# Patient Record
Sex: MALE | Race: WHITE
[De-identification: names, ages, dates, MRNs, and addresses within clinical notes are randomized per-mention and may not be internally consistent; named-entity substitution may affect disease eponyms.]

---

## 2019-04-08 NOTE — EDM.PDOC
ED HPI GENERAL MEDICAL PROBLEM





- General


Chief Complaint: Chest Pain


Stated Complaint: PT HAS CHEST PAINS


Time Seen by Provider: 04/08/19 23:03





- History of Present Illness


INITIAL COMMENTS - FREE TEXT/NARRATIVE: 





HISTORY AND PHYSICAL:





History of present illness:


The patient is a 39-year-old male with a history of hypertension for which he 

takes Toprol-XL but has not had it for the last 2-3 weeks and presents with 

chest pain to the left of the sternum and the upper part of his chest wall that 

started yesterday. He denies any recent upper respiratory symptoms cough runny 

nose sore throat or shortness of breath and has had no trauma to the area. The 

pain does not radiate but sits in that location and it is worse with certain 

movements or if he touches it. The patient said that he works night shift and 

that started when he was at work. He is very active at work and normally does 

not have discomfort or issues with activities. He was able to sleep during the 

day today and when he woke up the pain was still there. He took some Tylenol 

and it did not help so is here for evaluation. The patient has no significant 

family history. In the ED he has no associated symptoms of nausea vomiting 

abdominal pain or shortness of breath. He describes the pain as a sharp pain 

which is localized to the area he is pointing at





Review of systems: 


As per history of present illness and below otherwise all systems reviewed and 

negative.





Past medical history: 


As per history of present illness and as reviewed below otherwise 

noncontributory.





Surgical history: 


As per history of present illness and as reviewed below otherwise 

noncontributory.





Social history: 


No reported history of drug or alcohol abuse.





Family history: 


As per history of present illness and as reviewed below otherwise 

noncontributory.





Physical exam:


HEENT: Atraumatic, normocephalic,  negative for conjunctival pallor or scleral 

icterus, mucous membranes moist, throat clear, neck supple, nontender, trachea 

midline.


Lungs: Clear to auscultation, breath sounds equal bilaterally, chest with 

tenderness to palpation at the left costochondral margin without defects 

deformities or crepitus. There is no wheezing stridor or work of breathing


Heart: S1S2, regular rate and rhythm no overt murmurs


Abdomen: Soft, nondistended, nontender. Negative for masses or 

hepatosplenomegaly. NABS


Pelvis: Stable nontender.


Genitourinary: Deferred.


Rectal: Deferred.


Extremities: Atraumatic, negative for cords or calf pain. Neurovascular 

unremarkable. No pedal edema


Neuro: Awake, alert, oriented. Cranial nerves II through XII unremarkable. 

Cerebellum unremarkable. Motor and sensory unremarkable throughout. Exam 

nonfocal.





Diagnostics:


EKG chest x-ray CBC CMP troponin





Therapeutics:


Toradol aspirin





Patient is feeling improved and is advised for close follow-up.





Impression: 


Left chest wall pain, reproducible, costochondritis





Definitive disposition and diagnosis as appropriate pending reevaluation and 

review of above.


  ** Chest


Pain Score (Numeric/FACES): 8





- Related Data


 Allergies











Allergy/AdvReac Type Severity Reaction Status Date / Time


 


No Known Allergies Allergy   Verified 04/08/19 23:03











Home Meds: 


 Home Meds





Metoprolol Succinate [Toprol XL 50mg] 50 mg PO DAILY 04/08/19 [History]











ED ROS GENERAL





- Review of Systems


Review Of Systems: ROS reveals no pertinent complaints other than HPI.





ED EXAM, GENERAL





- Physical Exam


Exam: See Below (See dictation)





Course





- Vital Signs


Last Recorded V/S: 


 Last Vital Signs











Temp  36.6 C   04/08/19 23:01


 


Pulse  86   04/08/19 23:01


 


Resp  20   04/08/19 23:01


 


BP  143/97 H  04/08/19 23:01


 


Pulse Ox  99   04/08/19 23:01














- Orders/Labs/Meds


Orders: 


 Active Orders 24 hr











 Category Date Time Status


 


 EKG Documentation Completion [RC] STAT Care  04/08/19 23:08 Active











Labs: 


 Laboratory Tests











  04/08/19 04/08/19 Range/Units





  23:18 23:18 


 


WBC  8.92   (4.0-11.0)  K/uL


 


RBC  4.89   (4.50-5.90)  M/uL


 


Hgb  15.2   (13.0-17.0)  g/dL


 


Hct  42.9   (38.0-50.0)  %


 


MCV  87.7   (80.0-98.0)  fL


 


MCH  31.1   (27.0-32.0)  pg


 


MCHC  35.4   (31.0-37.0)  g/dL


 


RDW Std Deviation  41.8   (28.0-62.0)  fl


 


RDW Coeff of Jadiel  13   (11.0-15.0)  %


 


Plt Count  284   (150-400)  K/uL


 


MPV  10.10   (7.40-12.00)  fL


 


Neut % (Auto)  52.8   (48.0-80.0)  %


 


Lymph % (Auto)  31.4   (16.0-40.0)  %


 


Mono % (Auto)  12.0   (0.0-15.0)  %


 


Eos % (Auto)  3.5   (0.0-7.0)  %


 


Baso % (Auto)  0.3   (0.0-1.5)  %


 


Neut # (Auto)  4.7   (1.4-5.7)  K/uL


 


Lymph # (Auto)  2.8 H   (0.6-2.4)  K/uL


 


Mono # (Auto)  1.1 H   (0.0-0.8)  K/uL


 


Eos # (Auto)  0.3   (0.0-0.7)  K/uL


 


Baso # (Auto)  0.0   (0.0-0.1)  K/uL


 


Nucleated RBC %  0.0   /100WBC


 


Nucleated RBCs #  0   K/uL


 


Sodium   139  (136-148)  mmol/L


 


Potassium   3.9  (3.5-5.1)  mmol/L


 


Chloride   105  ()  mmol/L


 


Carbon Dioxide   23.6  (21.0-32.0)  mmol/L


 


BUN   12  (7.0-18.0)  mg/dL


 


Creatinine   1.0  (0.8-1.3)  mg/dL


 


Est Cr Clr Drug Dosing   103.54  mL/min


 


Estimated GFR (MDRD)   > 60.0  ml/min


 


Glucose   115 H  ()  mg/dL


 


Calcium   8.8  (8.5-10.1)  mg/dL


 


Total Bilirubin   0.3  (0.2-1.0)  mg/dL


 


AST   21  (15-37)  IU/L


 


ALT   39  (14-63)  IU/L


 


Alkaline Phosphatase   56  ()  U/L


 


Troponin I   < 0.050  (0.000-0.056)  ng/mL


 


Total Protein   7.3  (6.4-8.2)  g/dL


 


Albumin   3.6  (3.4-5.0)  g/dL


 


Globulin   3.7  (2.6-4.0)  g/dL


 


Albumin/Globulin Ratio   1.0  (0.9-1.6)  











Meds: 


Medications














Discontinued Medications














Generic Name Dose Route Start Last Admin





  Trade Name Teresa  PRN Reason Stop Dose Admin


 


Aspirin  324 mg  04/08/19 23:08  04/08/19 23:16





  Aspirin  PO  04/08/19 23:09  324 mg





  ONETIME ONE   Administration





     





     





     





     


 


Ketorolac Tromethamine  60 mg  04/08/19 23:08  04/08/19 23:17





  Toradol  IM  04/08/19 23:09  60 mg





  ONETIME ONE   Administration





     





     





     





     














Departure





- Departure


Time of Disposition: 00:27


Disposition: Home, Self-Care 01


Condition: Good


Clinical Impression: 


 Chest wall pain








- Discharge Information


Forms:  ED Department Discharge


Additional Instructions: 


The following information is given to patients seen in the emergency department 

who are being discharged to home. This information is to outline your options 

for follow-up care. We provide all patients seen in our emergency department 

with a follow-up referral.





The need for follow-up, as well as the timing and circumstances, are variable 

depending upon the specifics of your emergency department visit.





If you don't have a primary care physician on staff, we will provide you with a 

referral. We always advise you to contact your personal physician following an 

emergency department visit to inform them of the circumstance of the visit and 

for follow-up with them and/or the need for any referrals to a consulting 

specialist.





The emergency department will also refer you to a specialist when appropriate. 

This referral assures that you have the opportunity for followup care with a 

specialist. All of these measure are taken in an effort to provide you with 

optimal care, which includes your followup.





Under all circumstances we always encourage you to contact your private 

physician who remains a resource for coordinating  your care. When calling for 

followup care, please make the office aware that this follow-up is from your 

recent emergency room visit. If for any reason you are refused follow-up, 

please contact the [facility] emergency 

department at [phone] and ask to speak to the emergency department 

charge nurse.





[facility] 


Primary care- Internal Medicine and Family [address] [address]


[phone]








Please use over-the-counter ibuprofen/Motrin for pain as we discussed and try 

to ice areas after activities and work. Please resume taking your Toprol-XL as 

a given you a small refill to restart. Please call and schedule a follow-up 

appointment in the clinic and return to ER as needed and as discussed





- My Orders


Last 24 Hours: 


My Active Orders





04/08/19 23:08


EKG Documentation Completion [RC] STAT 














- Assessment/Plan


Last 24 Hours: 


My Active Orders





04/08/19 23:08


EKG Documentation Completion [RC] STAT

## 2019-04-09 NOTE — CR
INDICATION:



Chest pain 



COMPARISON:



None available. 



FINDINGS:



An erect single view of the chest was obtained at 23 23 hours. 



The lungs are clear. No focal or diffuse infiltrates are present.



Incidental note is made of an azygos fissure.



The heart is normal in size. 



The mediastinum is normal in appearance. 



The osseous structures are normal in appearance for the patient`s age. 



IMPRESSION:



Normal chest single view.



Dictated by Yohan Gaspar MD @ Apr 9 2019 12:22AM



Signed by Dr. Yohan Gaspar @ Apr 9 2019 12:23AM

## 2019-04-11 NOTE — EDM.PDOC
ED HPI GENERAL MEDICAL PROBLEM





- General


Chief Complaint: Back Pain or Injury


Stated Complaint: PT HAS BACK AND STOMACH PAIN


Time Seen by Provider: 04/11/19 23:10





- History of Present Illness


INITIAL COMMENTS - FREE TEXT/NARRATIVE: 





HISTORY AND PHYSICAL:





History of present illness:


Patient is a 39-year-old male with no significant GI or  history who presents 

with complaints of bilateral lower back pain that started yesterday and this 

morning waking up and having the back pain still present but then radiating to 

bilateral lower abdominal area. He has not had any difficulty initiating a 

stream urgency frequency dysuria or hematuria and no upper flank pain. He had 

nausea and vomiting throughout the day and was not able to keep much down but 

did have an appetite. He did not take his temperature or have a fever that he 

is aware of and has no chest pain shortness of breath or upper abdominal pain. 

He says the pain radiates to his testicles but he has not noticed any stress 

testicular masses or swelling. The patient has no weakness in his legs nor does 

the back pain or abdominal pain radiate to his legs and has no neurosensory 

changes in his legs. He has no upper back pain and no midline back pain. The 

patient took nothing for this discomfort prior to coming here and nothing for 

the nausea and vomiting. He has had normal bowel movements without diarrhea 

black or bloody stools.





Review of systems: 


As per history of present illness and below otherwise all systems reviewed and 

negative.





Past medical history: 


As per history of present illness and as reviewed below otherwise 

noncontributory.





Surgical history: 


As per history of present illness and as reviewed below otherwise 

noncontributory.





Social history: 


No reported history of drug or alcohol abuse.





Family history: 


As per history of present illness and as reviewed below otherwise 

noncontributory.





Physical exam:


General: Well-developed well-nourished man who is nontoxic and moves very 

easily in the ED without distress. Vital signs are noted by me


HEENT: Atraumatic, normocephalic, pupils reactive, negative for conjunctival 

pallor or scleral icterus, mucous membranes moist, throat clear, neck supple, 

nontender, trachea midline.


Lungs: Clear to auscultation, breath sounds equal bilaterally, chest nontender.


Heart: S1S2, regular, negative for clicks, rubs, or JVD.


Abdomen: Soft, nondistended some mild tenderness in the lower abdomen 

throughout without rebound or guarding and bowel sounds are hypoactive. There 

is tympany on percussion of the upper abdomen without tenderness. Negative for 

masses or hepatosplenomegaly. Negative for costovertebral tenderness.


Pelvis: Stable nontender.


Genitourinary: Normal male with descended testicles bilaterally without any 

evidence of masses tenderness or swelling


Rectal: Deferred.


Extremities: Atraumatic, negative for cords or calf pain. Neurovascular 

unremarkable. Full range of motion of all extremities without defects or 

deficits


Neuro: Awake, alert, oriented. Cranial nerves II through XII unremarkable. 

Cerebellum unremarkable. Motor and sensory unremarkable throughout. Exam 

nonfocal.


Back: There are no midline step-offs in his defects of the thoracic or lumbar 

spine no CVA tenderness and no posterior pelvis tenderness. On palpation of the 

paraspinal musculature I cannot reproduce the pain.


Diagnostics:


CBC CMP amylase lipase UA with reflex CT scan abdomen and pelvis





Therapeutics:


IV fluids Toradol Zofran





Patient is resting comfortably and says his nausea is much improved and his 

pain is also improved. I discussed all testing results with him and significant 

other at bedside and I discussed the CT scan findings and labs with Dr. Rodrigues at 1 AM as well as with Dr. Ochoa our hospitalist at 1:12 AM. As the 

CT scan is abnormal with the free fluid Dr. Rodrigues recommends observation and 

he will be on consult if Dr. Ochoa would like that. Dr. Ochoa states he would 

like Dr. Rodrigues on consult and he will admit him as an observation. I've 

written for maintenance IV fluids and the patient is agreeable they're both 

aware of the lab abnormalities and the patient's clinical state and presentation





Impression: 


Lower back and lower abdominal pain with abnormal CT etiology unclear, 

paracentral disc disease L4-L5 stable, diminished renal function etiology 

unclear





Definitive disposition and diagnosis as appropriate pending reevaluation and 

review of above.


  ** low back;low abdomen


Pain Score (Numeric/FACES): 6





- Related Data


 Allergies











Allergy/AdvReac Type Severity Reaction Status Date / Time


 


No Known Allergies Allergy   Verified 04/11/19 23:08











Home Meds: 


 Home Meds





Metoprolol Succinate [Toprol XL 50mg] 50 mg PO DAILY 04/08/19 [History]











Past Medical History


Cardiovascular History: Reports: Hypertension


Neurological History: Reports: Concussion


Psychiatric History: Reports: Panic Attack





- Infectious Disease History


Infectious Disease History: Reports: Chicken Pox





Social & Family History





- Family History


Family Medical History: Noncontributory





- Tobacco Use


Smoking Status *Q: Current Every Day Smoker


Years of Tobacco use: 20


Packs/Tins Daily: 1





- Recreational Drug Use


Recreational Drug Use: No





ED ROS GENERAL





- Review of Systems


Review Of Systems: ROS reveals no pertinent complaints other than HPI.





ED EXAM, GENERAL





- Physical Exam


Exam: See Below (see Dictation)





Course





- Vital Signs


Last Recorded V/S: 


 Last Vital Signs











Temp  37.1 C   04/11/19 23:00


 


Pulse  67   04/11/19 23:00


 


Resp  18   04/11/19 23:00


 


BP  178/104 H  04/11/19 23:00


 


Pulse Ox  96   04/11/19 23:00














- Orders/Labs/Meds


Orders: 


 Active Orders 24 hr











 Category Date Time Status


 


 Patient Status [ADT] Stat ADT  04/12/19 01:15 Ordered


 


 Notify Provider Consults [RC] ASDIRECTED Care  04/12/19 01:15 Ordered


 


 Consult to Physician [CONS] Stat Cons  04/12/19 01:15 Ordered


 


 Sodium Chloride 0.9% @ 150 MLS/HR (1,000ml) Med  04/12/19 01:15 Ordered





 Sodium Chloride 0.9% [Normal Saline] 1,000 ml   





 IV ASDIRECTED   


 


 Sodium Chloride 0.9% [Saline Flush] Med  04/11/19 23:15 Active





 10 ml FLUSH ASDIRECTED PRN   


 


 Sodium Chloride 0.9% [Saline Flush] Med  04/11/19 23:15 Active





 2.5 ml FLUSH ASDIRECTED PRN   


 


 Saline Lock Insert [OM.PC] Stat Oth  04/11/19 23:14 Ordered








 Medication Orders





Sodium Chloride (Saline Flush)  10 ml FLUSH ASDIRECTED PRN


   PRN Reason: Keep Vein Open


Sodium Chloride (Saline Flush)  2.5 ml FLUSH ASDIRECTED PRN


   PRN Reason: Keep Vein Open








Labs: 


 Laboratory Tests











  04/11/19 04/11/19 04/11/19 Range/Units





  22:23 23:22 23:22 


 


WBC   12.94 H   (4.0-11.0)  K/uL


 


RBC   4.96   (4.50-5.90)  M/uL


 


Hgb   15.3   (13.0-17.0)  g/dL


 


Hct   43.6   (38.0-50.0)  %


 


MCV   87.9   (80.0-98.0)  fL


 


MCH   30.8   (27.0-32.0)  pg


 


MCHC   35.1   (31.0-37.0)  g/dL


 


RDW Std Deviation   41.3   (28.0-62.0)  fl


 


RDW Coeff of Jadiel   13   (11.0-15.0)  %


 


Plt Count   274   (150-400)  K/uL


 


MPV   10.50   (7.40-12.00)  fL


 


Neut % (Auto)   80.3 H   (48.0-80.0)  %


 


Lymph % (Auto)   7.7 L   (16.0-40.0)  %


 


Mono % (Auto)   11.7   (0.0-15.0)  %


 


Eos % (Auto)   0.1   (0.0-7.0)  %


 


Baso % (Auto)   0.2   (0.0-1.5)  %


 


Neut # (Auto)   10.4 H   (1.4-5.7)  K/uL


 


Lymph # (Auto)   1.0   (0.6-2.4)  K/uL


 


Mono # (Auto)   1.5 H   (0.0-0.8)  K/uL


 


Eos # (Auto)   0.0   (0.0-0.7)  K/uL


 


Baso # (Auto)   0.0   (0.0-0.1)  K/uL


 


Nucleated RBC %   0.0   /100WBC


 


Nucleated RBCs #   0   K/uL


 


Sodium    144  (136-148)  mmol/L


 


Potassium    3.7  (3.5-5.1)  mmol/L


 


Chloride    106  ()  mmol/L


 


Carbon Dioxide    24.7  (21.0-32.0)  mmol/L


 


BUN    14  (7.0-18.0)  mg/dL


 


Creatinine    2.4 H  (0.8-1.3)  mg/dL


 


Est Cr Clr Drug Dosing    43.14  mL/min


 


Estimated GFR (MDRD)    30.0  ml/min


 


Glucose    124 H  ()  mg/dL


 


Calcium    8.8  (8.5-10.1)  mg/dL


 


Total Bilirubin    0.5  (0.2-1.0)  mg/dL


 


AST    22  (15-37)  IU/L


 


ALT    27  (14-63)  IU/L


 


Alkaline Phosphatase    53  ()  U/L


 


Total Protein    7.3  (6.4-8.2)  g/dL


 


Albumin    3.4  (3.4-5.0)  g/dL


 


Globulin    3.9  (2.6-4.0)  g/dL


 


Albumin/Globulin Ratio    0.9  (0.9-1.6)  


 


Amylase    30  ()  U/L


 


Lipase    95  ()  U/L


 


Urine Color  YELLOW    


 


Urine Appearance  CLEAR    


 


Urine pH  6.0    (5.0-8.0)  


 


Ur Specific Gravity  1.010    (1.001-1.035)  


 


Urine Protein  100 H    (NEGATIVE)  mg/dL


 


Urine Glucose (UA)  NEGATIVE    (NEGATIVE)  mg/dL


 


Urine Ketones  NEGATIVE    (NEGATIVE)  mg/dL


 


Urine Occult Blood  SMALL H    (NEGATIVE)  


 


Urine Nitrite  NEGATIVE    (NEGATIVE)  


 


Urine Bilirubin  NEGATIVE    (NEGATIVE)  


 


Urine Urobilinogen  0.2    (<2.0)  EU/dL


 


Ur Leukocyte Esterase  NEGATIVE    (NEGATIVE)  


 


Urine RBC  0-2    (0-2/HPF)  


 


Urine WBC  0-3    (0-5/HPF)  


 


Ur Epithelial Cells  RARE    (NONE-FEW)  


 


Urine Bacteria  FEW    (NEGATIVE)  


 


Urine Mucus  LIGHT    (NONE-MOD)  











Meds: 


Medications











Generic Name Dose Route Start Last Admin





  Trade Name Freq  PRN Reason Stop Dose Admin


 


Sodium Chloride  10 ml  04/11/19 23:15  





  Saline Flush  FLUSH   





  ASDIRECTED PRN   





  Keep Vein Open   





     





     





     


 


Sodium Chloride  2.5 ml  04/11/19 23:15  





  Saline Flush  FLUSH   





  ASDIRECTED PRN   





  Keep Vein Open   





     





     





     














Discontinued Medications














Generic Name Dose Route Start Last Admin





  Trade Name Freq  PRN Reason Stop Dose Admin


 


Sodium Chloride  1,000 mls @ 999 mls/hr  04/11/19 23:15  04/11/19 23:24





  Normal Saline  IV  04/12/19 00:15  999 mls/hr





  STAT ONE   Administration





     





     





     





     


 


Ketorolac Tromethamine  30 mg  04/11/19 23:15  04/11/19 23:26





  Toradol  IVPUSH  04/11/19 23:16  30 mg





  ONETIME ONE   Administration





     





     





     





     


 


Ondansetron HCl  4 mg  04/11/19 23:15  04/11/19 23:25





  Zofran  IVPUSH  04/11/19 23:16  4 mg





  ONETIME ONE   Administration





     





     





     





     














Departure





- Departure


Time of Disposition: 01:18


Disposition: Refer to Observation


Condition: Good


Clinical Impression: 


 Abnormal CT scan, Decreased renal function





Abdominal pain


Qualifiers:


 Abdominal location: lower abdomen, unspecified Qualified Code(s): R10.30 - 

Lower abdominal pain, unspecified








- Discharge Information


Referrals: 


PCP,None [Primary Care Provider] - 


Forms:  ED Department Discharge





- My Orders


Last 24 Hours: 


My Active Orders





04/11/19 23:14


Saline Lock Insert [OM.PC] Stat 





04/11/19 23:15


Sodium Chloride 0.9% [Saline Flush]   10 ml FLUSH ASDIRECTED PRN 


Sodium Chloride 0.9% [Saline Flush]   2.5 ml FLUSH ASDIRECTED PRN 





04/12/19 01:15


Patient Status [ADT] Stat 


Notify Provider Consults [RC] ASDIRECTED 


Consult to Physician [CONS] Stat 


Sodium Chloride 0.9% @ 150 MLS/HR (1,000ml) Sodium Chloride 0.9% [Normal Saline

] 1,000 ml IV ASDIRECTED 














- Assessment/Plan


Last 24 Hours: 


My Active Orders





04/11/19 23:14


Saline Lock Insert [OM.PC] Stat 





04/11/19 23:15


Sodium Chloride 0.9% [Saline Flush]   10 ml FLUSH ASDIRECTED PRN 


Sodium Chloride 0.9% [Saline Flush]   2.5 ml FLUSH ASDIRECTED PRN 





04/12/19 01:15


Patient Status [ADT] Stat 


Notify Provider Consults [RC] ASDIRECTED 


Consult to Physician [CONS] Stat 


Sodium Chloride 0.9% @ 150 MLS/HR (1,000ml) Sodium Chloride 0.9% [Normal Saline

] 1,000 ml IV ASDIRECTED

## 2019-04-12 NOTE — [id number]
**Discharge Summary





- Hospital Course


HPI Initial Comments: 





The patient was admitted due to lower abdominal and lower back pain. CT scan 

showed pelvic free fluid.


Diagnosis: Stroke: No





- Discharge Data


Discharge Date: 04/12/19


Discharge Disposition: Home, Self-Care 01


Condition: Good





- Discharge Diagnosis/Problem(s)


(1) HTN (hypertension)


SNOMED Code(s): 49100233


   ICD Code: I10 - ESSENTIAL (PRIMARY) HYPERTENSION   Status: Chronic   Priority

: High   


Qualifiers: 


   Hypertension type: essential hypertension   Qualified Code(s): I10 - 

Essential (primary) hypertension   





(2) Abdominal pain


SNOMED Code(s): 85185834


   ICD Code: R10.9 - UNSPECIFIED ABDOMINAL PAIN   Status: Acute   Priority: 

High   


Qualifiers: 


   Abdominal location: lower abdomen, unspecified   Qualified Code(s): R10.30 - 

Lower abdominal pain, unspecified   





(3) Decreased renal function


SNOMED Code(s): 38724246


   ICD Code: N28.9 - DISORDER OF KIDNEY AND URETER, UNSPECIFIED   Status: Acute

   Priority: High   





(4) Renal insufficiency, mild


SNOMED Code(s): 286396326


   ICD Code: N28.9 - DISORDER OF KIDNEY AND URETER, UNSPECIFIED   Status: Acute

   Priority: High   





(5) Bacterial prostatitis


SNOMED Code(s): 833629044024148


   ICD Code: N41.8 - OTHER INFLAMMATORY DISEASES OF PROSTATE   Status: Acute   





- Patient Summary/Data


Hospital Course: 





The patient is a 39-year-old gentleman who had presented to the emergency 

department both on April 9, 2019 and April 11, 2019 with a complaint of 

bilateral lower abdominal pain. The patient had denied any difficulties with 

urination, hematuria or dysuria. He also had denied any fever or chills. The 

patient says that he has had pain that radiates from his lower abdomen into his 

testicles. The patient also has had persistent nausea and vomiting. The patient 

has had no specific aggravating or relieving factors other than pain 

medications made his abdominal and back pain better. CT scan through the 

emergency department had revealed minimal amount of free pelvic fluid. The 

surgeon had been consulted with regards to this informally consult this this 

was not due to any abdominal abnormality. The patient was also noted to have 

calcifications in his prostate. During the short course of hospitalization the 

patient had continued to improve.The patient had been placed on ciprofloxacin 

500 mg by mouth to twice a day as a concern for chronic prostatitis. He been 

referred to urology. He also been given a prescription for tramadol. Tramadol 

is at 50 mg by mouth every 6 hours as needed for pain. The patient had been 

tolerating his diet as recommended to continue with the diet as tolerated. He 

is also to have activity as tolerated. I've recommended further the patient 

follow-up with a primary care physician. The patient had been hemodynamically 

stable and he is discharged from acute hospitalization with the recommendations 

listed above.





- Patient Instructions


Diet: Heart Healthy Diet


Activity: As Tolerated


Driving: [address] Drive Today





- Discharge Plan


*PRESCRIPTION DRUG MONITORING PROGRAM REVIEWED*: No


*COPY OF PRESCRIPTION DRUG MONITORING REPORT IN PATIENT LEXIS: No


Prescriptions/Med Rec: 


Ciprofloxacin HCl [Cipro] 500 mg PO BID #14 tablet


traMADol [Ultram] 50 mg PO Q6H PRN #21 tab


 PRN Reason: Abdominal Pain


Home Medications: 


 Home Meds





Metoprolol Succinate [Toprol XL 50mg] 50 mg PO DAILY 04/08/19 [History]


Ciprofloxacin HCl [Cipro] 500 mg PO BID #14 tablet 04/12/19 [Rx]


traMADol [Ultram] 50 mg PO Q6H PRN #21 tab 04/12/19 [Rx]








Oxygen Therapy Mode: Room Air


Patient Handouts:  Tramadol tablets, Prostatitis, Easy-to-Read, Ciprofloxacin 

tablets


Referrals: 


Marcell Hampton MD [Resident] -  (Please call Monday, April 15 and schedule a 

follow up appointment for 1 week.)


Toma Florian MD [Physician] -  (Please call on Monday, April 15th and 

schedule a follow up appointment as soon as possible.)





- Discharge Summary/Plan Comment


DC Time >30 min.: Yes





- General Info


Date of Service: 04/13/19


Admission Dx/Problem (Free Text: 


 Admission Diagnosis/Problem





Admission Diagnosis/Problem      Abdominal pain








Functional Status: Reports: Pain Controlled





- Review of Systems


General: Reports: No Symptoms


HEENT: Reports: No Symptoms


Pulmonary: Reports: No Symptoms


Cardiovascular: Reports: No Symptoms


Gastrointestinal: Reports: No Symptoms


Genitourinary: Reports: No Symptoms


Musculoskeletal: Reports: No Symptoms


Skin: Reports: No Symptoms


Neurological: Reports: No Symptoms


Psychiatric: Reports: No Symptoms





- Patient Data


Vitals - Most Recent: 


 Last Vital Signs











Temp  37.4 C   04/12/19 16:00


 


Pulse  65   04/12/19 16:00


 


Resp  18   04/12/19 16:00


 


BP  169/99 H  04/12/19 16:00


 


Pulse Ox  98   04/12/19 16:00











Weight - Most Recent: 87.203 kg


I&O - Last 24 hours: 


 Intake & Output











 04/12/19 04/12/19 04/12/19





 06:59 14:59 22:59


 


Intake Total 450  1540


 


Output Total 350  400


 


Balance 100  1140











Lab Results - Last 24 hrs: 


 Laboratory Results - last 24 hr











  04/11/19 04/11/19 04/11/19 Range/Units





  22:23 23:22 23:22 


 


WBC   12.94 H   (4.0-11.0)  K/uL


 


RBC   4.96   (4.50-5.90)  M/uL


 


Hgb   15.3   (13.0-17.0)  g/dL


 


Hct   43.6   (38.0-50.0)  %


 


MCV   87.9   (80.0-98.0)  fL


 


MCH   30.8   (27.0-32.0)  pg


 


MCHC   35.1   (31.0-37.0)  g/dL


 


RDW Std Deviation   41.3   (28.0-62.0)  fl


 


RDW Coeff of Jadiel   13   (11.0-15.0)  %


 


Plt Count   274   (150-400)  K/uL


 


MPV   10.50   (7.40-12.00)  fL


 


Neut % (Auto)   80.3 H   (48.0-80.0)  %


 


Lymph % (Auto)   7.7 L   (16.0-40.0)  %


 


Mono % (Auto)   11.7   (0.0-15.0)  %


 


Eos % (Auto)   0.1   (0.0-7.0)  %


 


Baso % (Auto)   0.2   (0.0-1.5)  %


 


Neut # (Auto)   10.4 H   (1.4-5.7)  K/uL


 


Lymph # (Auto)   1.0   (0.6-2.4)  K/uL


 


Mono # (Auto)   1.5 H   (0.0-0.8)  K/uL


 


Eos # (Auto)   0.0   (0.0-0.7)  K/uL


 


Baso # (Auto)   0.0   (0.0-0.1)  K/uL


 


Nucleated RBC %   0.0   /100WBC


 


Nucleated RBCs #   0   K/uL


 


Sodium    144  (136-148)  mmol/L


 


Potassium    3.7  (3.5-5.1)  mmol/L


 


Chloride    106  ()  mmol/L


 


Carbon Dioxide    24.7  (21.0-32.0)  mmol/L


 


BUN    14  (7.0-18.0)  mg/dL


 


Creatinine    2.4 H  (0.8-1.3)  mg/dL


 


Est Cr Clr Drug Dosing    43.14  mL/min


 


Estimated GFR (MDRD)    30.0  ml/min


 


Glucose    124 H  ()  mg/dL


 


Calcium    8.8  (8.5-10.1)  mg/dL


 


Total Bilirubin    0.5  (0.2-1.0)  mg/dL


 


AST    22  (15-37)  IU/L


 


ALT    27  (14-63)  IU/L


 


Alkaline Phosphatase    53  ()  U/L


 


Total Protein    7.3  (6.4-8.2)  g/dL


 


Albumin    3.4  (3.4-5.0)  g/dL


 


Globulin    3.9  (2.6-4.0)  g/dL


 


Albumin/Globulin Ratio    0.9  (0.9-1.6)  


 


Amylase    30  ()  U/L


 


Lipase    95  ()  U/L


 


Urine Color  YELLOW    


 


Urine Appearance  CLEAR    


 


Urine pH  6.0    (5.0-8.0)  


 


Ur Specific Gravity  1.010    (1.001-1.035)  


 


Urine Protein  100 H    (NEGATIVE)  mg/dL


 


Urine Glucose (UA)  NEGATIVE    (NEGATIVE)  mg/dL


 


Urine Ketones  NEGATIVE    (NEGATIVE)  mg/dL


 


Urine Occult Blood  SMALL H    (NEGATIVE)  


 


Urine Nitrite  NEGATIVE    (NEGATIVE)  


 


Urine Bilirubin  NEGATIVE    (NEGATIVE)  


 


Urine Urobilinogen  0.2    (<2.0)  EU/dL


 


Ur Leukocyte Esterase  NEGATIVE    (NEGATIVE)  


 


Urine RBC  0-2    (0-2/HPF)  


 


Urine WBC  0-3    (0-5/HPF)  


 


Ur Epithelial Cells  RARE    (NONE-FEW)  


 


Urine Bacteria  FEW    (NEGATIVE)  


 


Urine Mucus  LIGHT    (NONE-MOD)  














  04/12/19 04/12/19 04/12/19 Range/Units





  05:20 05:20 14:39 


 


WBC  9.64    (4.0-11.0)  K/uL


 


RBC  4.52    (4.50-5.90)  M/uL


 


Hgb  13.6    (13.0-17.0)  g/dL


 


Hct  40.3    (38.0-50.0)  %


 


MCV  89.2    (80.0-98.0)  fL


 


MCH  30.1    (27.0-32.0)  pg


 


MCHC  33.7    (31.0-37.0)  g/dL


 


RDW Std Deviation  42.4    (28.0-62.0)  fl


 


RDW Coeff of Jadiel  13    (11.0-15.0)  %


 


Plt Count  238    (150-400)  K/uL


 


MPV  10.40    (7.40-12.00)  fL


 


Neut % (Auto)  64.7    (48.0-80.0)  %


 


Lymph % (Auto)  21.4    (16.0-40.0)  %


 


Mono % (Auto)  13.4    (0.0-15.0)  %


 


Eos % (Auto)  0.4    (0.0-7.0)  %


 


Baso % (Auto)  0.1    (0.0-1.5)  %


 


Neut # (Auto)  6.2 H    (1.4-5.7)  K/uL


 


Lymph # (Auto)  2.1    (0.6-2.4)  K/uL


 


Mono # (Auto)  1.3 H    (0.0-0.8)  K/uL


 


Eos # (Auto)  0.0    (0.0-0.7)  K/uL


 


Baso # (Auto)  0.0    (0.0-0.1)  K/uL


 


Nucleated RBC %  0.0    /100WBC


 


Nucleated RBCs #  0    K/uL


 


Sodium   144  142  (136-148)  mmol/L


 


Potassium   3.4 L  3.5  (3.5-5.1)  mmol/L


 


Chloride   106  105  ()  mmol/L


 


Carbon Dioxide   26.5  27.3  (21.0-32.0)  mmol/L


 


BUN   14  19 H  (7.0-18.0)  mg/dL


 


Creatinine   2.2 H  2.5 H  (0.8-1.3)  mg/dL


 


Est Cr Clr Drug Dosing   47.06  41.42  mL/min


 


Estimated GFR (MDRD)   33.2  28.6  ml/min


 


Glucose   101  117 H  ()  mg/dL


 


Calcium   8.0 L  8.0 L  (8.5-10.1)  mg/dL


 


Total Bilirubin     (0.2-1.0)  mg/dL


 


AST     (15-37)  IU/L


 


ALT     (14-63)  IU/L


 


Alkaline Phosphatase     ()  U/L


 


Total Protein     (6.4-8.2)  g/dL


 


Albumin     (3.4-5.0)  g/dL


 


Globulin     (2.6-4.0)  g/dL


 


Albumin/Globulin Ratio     (0.9-1.6)  


 


Amylase     ()  U/L


 


Lipase     ()  U/L


 


Urine Color     


 


Urine Appearance     


 


Urine pH     (5.0-8.0)  


 


Ur Specific Gravity     (1.001-1.035)  


 


Urine Protein     (NEGATIVE)  mg/dL


 


Urine Glucose (UA)     (NEGATIVE)  mg/dL


 


Urine Ketones     (NEGATIVE)  mg/dL


 


Urine Occult Blood     (NEGATIVE)  


 


Urine Nitrite     (NEGATIVE)  


 


Urine Bilirubin     (NEGATIVE)  


 


Urine Urobilinogen     (<2.0)  EU/dL


 


Ur Leukocyte Esterase     (NEGATIVE)  


 


Urine RBC     (0-2/HPF)  


 


Urine WBC     (0-5/HPF)  


 


Ur Epithelial Cells     (NONE-FEW)  


 


Urine Bacteria     (NEGATIVE)  


 


Urine Mucus     (NONE-MOD)  











Med Orders - Current: 


 Current Medications





Acetaminophen (Tylenol)  650 mg PO Q6H PRN


   PRN Reason: Pain (mild 1-3)


   Last Admin: 04/12/19 16:14 Dose:  650 mg


Metoprolol Succinate (Toprol Xl)  50 mg PO DAILY GREG


Morphine Sulfate (Morphine)  2 mg IVPUSH Q2H PRN


   PRN Reason: Pain (severe 7-10)


Ondansetron HCl (Zofran)  4 mg IVPUSH Q6H PRN


   PRN Reason: Nausea/Vomiting


Oxycodone HCl (Oxycodone)  5 mg PO Q4H PRN


   PRN Reason: Pain (moderate 4-6)


   Last Admin: 04/12/19 08:06 Dose:  5 mg


Sodium Chloride (Saline Flush)  10 ml FLUSH ASDIRECTED PRN


   PRN Reason: Keep Vein Open


Sodium Chloride (Saline Flush)  2.5 ml FLUSH ASDIRECTED PRN


   PRN Reason: Keep Vein Open


Sodium Chloride (Saline Flush)  10 ml FLUSH ASDIRECTED PRN


   PRN Reason: Keep Vein Open


Sodium Chloride (Saline Flush)  2.5 ml FLUSH ASDIRECTED PRN


   PRN Reason: Keep Vein Open





Discontinued Medications





Sodium Chloride (Normal Saline)  1,000 mls @ 999 mls/hr IV STAT ONE


   Stop: 04/12/19 00:15


   Last Admin: 04/11/19 23:24 Dose:  999 mls/hr


Sodium Chloride (Normal Saline)  1,000 mls @ 150 mls/hr IV ASDIRECTED GREG


   Last Admin: 04/12/19 01:34 Dose:  150 mls/hr


Ketorolac Tromethamine (Toradol)  30 mg IVPUSH ONETIME ONE


   Stop: 04/11/19 23:16


   Last Admin: 04/11/19 23:26 Dose:  30 mg


Ondansetron HCl (Zofran)  4 mg IVPUSH ONETIME ONE


   Stop: 04/11/19 23:16


   Last Admin: 04/11/19 23:25 Dose:  4 mg











- Exam


Quality Assessment: Denies: Supplemental Oxygen


General: Reports: Alert, Oriented, Cooperative, No Acute Distress


HEENT: Reports: Pupils Equal, Pupils Reactive, EOMI


Neck: Reports: Supple, Trachea Midline


Lungs: Reports: Clear to Auscultation, Normal Respiratory Effort


Cardiovascular: Reports: Regular Rate, Regular Rhythm, No Murmurs


GI/Abdominal Exam: Normal Bowel Sounds, Soft, Non-Tender, No Distention


 (Male) Exam: Deferred


Rectal (Males) Exam: Deferred


Back Exam: Reports: Normal Inspection, Full Range of Motion


Extremities: Normal Inspection, No Pedal Edema


Skin: Reports: Warm, Dry, Intact


Neurological: Reports: No New Focal Deficit


Psy/Mental Status: Reports: Alert, Normal Affect, Normal Mood

## 2019-04-12 NOTE — CT
INDICATION:



Low back pain.  Nausea and vomiting 



TECHNIQUE:



CT abdomen and pelvis without contrast.



COMPARISON:



None available  



FINDINGS:



Lower chest: A calcified right lower lobe granuloma.  



Liver: Unremarkable.  



Spleen: Upper limits of normal splenic size.  A punctate calcified splenic 

granuloma.  



Pancreas: Unremarkable.  



Gallbladder and bile ducts: Mildly increased attenuation within the 

gallbladder could represent sludge.  



Adrenal glands: Unremarkable.  



Kidneys: No hydronephrosis or discrete urolithiasis. Apparent slight 

urothelial prominence at the UPJ`s. 



GI tract: Unremarkable.  Appendix is normal.  



Vascular structures: Unremarkable.  



Lymph nodes: Unremarkable.  



Miscellaneous:  Small to moderate pelvic free fluid. No free air. 



Pelvic Organs:  Unremarkable.  



Bones:  A left paracentral disc protrusion at L4-5. 



IMPRESSION:



No obstructive uropathy or discrete urolithiasis.  Apparent slight 

urothelial prominence at the UPJ`s.  Correlate with urinalysis to exclude a 

UTI. 



No evidence of appendicitis, diverticulitis or bowel obstruction. Small to 

moderate pelvic free fluid. 



A left paracentral disc protrusion at L4-5. 



Old granulomatous disease.



Dictated by Jero Fernandez MD @ 4/12/2019 12:57:52 AM



Please note that all CT scans at this facility use dose modulation, 

iterative reconstruction, and/or weight-based dosing when appropriate to 

reduce radiation dose to as low as reasonably achievable.



Dictated by: Jero Fernandez MD @ 04/12/2019 00:58:00



(Electronically Signed)

## 2019-04-12 NOTE — PCM.HP
H&P History of Present Illness





- General


Date of Service: 04/12/19


Admit Problem/Dx: 


 Admission Diagnosis/Problem





Admission Diagnosis/Problem      Abdominal pain








Source of Information: Patient


History Limitations: Reports: No Limitations





- History of Present Illness


Initial Comments - Free Text/Narative: 





The patient is a 39-year-old gentleman who had presented to the emergency 

department both on April 9, 2019 and April 11, 2019 with a complaint of 

bilateral lower abdominal pain. The patient had denied any difficulties with 

urination, hematuria or dysuria. He also had denied any fever or chills. The 

patient says that he has had pain that radiates from his lower abdomen into his 

testicles. The patient also has had persistent nausea and vomiting. The patient 

has had no specific aggravating or relieving factors other than pain 

medications made his abdominal and back pain better. CT scan through the 

emergency department had revealed minimal amount of free pelvic fluid. The 

patient has been in relatively good health otherwise. He only takes medications 

for hypertension. The patient had medication for his pain in his pain is much 

less today.


Onset of Symptoms: Reports: Sudden


Duration of Symptoms: Reports: Day(s):


Location: Reports: Abdomen, Back


Quality: Reports: Stabbing, Throbbing


Severity: Moderate


Improves with: Reports: Rest


Worsens with: Reports: Movement


Associated Symptoms: Reports: Nausea/Vomiting


  ** low back;low abdomen


Pain Score (Numeric/FACES): 2





- Related Data


Allergies/Adverse Reactions: 


 Allergies











Allergy/AdvReac Type Severity Reaction Status Date / Time


 


No Known Allergies Allergy   Verified 04/11/19 23:08











Home Medications: 


 Home Meds





Metoprolol Succinate [Toprol XL 50mg] 50 mg PO DAILY 04/08/19 [History]











Past Medical History


HEENT History: Reports: None


Cardiovascular History: Reports: Hypertension


Respiratory History: Reports: None


Gastrointestinal History: Reports: None


Genitourinary History: Reports: None


Musculoskeletal History: Reports: None


Neurological History: Reports: Concussion


Psychiatric History: Reports: Panic Attack


Endocrine/Metabolic History: Reports: None


Hematologic History: Reports: None


Immunologic History: Reports: None


Oncologic (Cancer) History: Reports: None


Dermatologic History: Reports: None





- Infectious Disease History


Infectious Disease History: Reports: Chicken Pox





Social & Family History





- Family History


Family Medical History: Noncontributory





- Tobacco Use


Smoking Status *Q: Current Every Day Smoker


Years of Tobacco use: 20


Packs/Tins Daily: 0.5


Used Tobacco, but Quit: No


Second Hand Smoke Exposure: No





- Caffeine Use


Caffeine Use: Reports: Coffee, Soda





- Alcohol Use


Days Per Week of Alcohol Use: 6


Number of Drinks Per Day: 2


Total Drinks Per Week: 12


Date of Last Drink: 04/11/19





- Recreational Drug Use


Recreational Drug Use: No





H&P Review of Systems





- Review of Systems:


Review Of Systems: See Below


General: Reports: No Symptoms


HEENT: Reports: No Symptoms


Pulmonary: Reports: No Symptoms


Cardiovascular: Reports: No Symptoms


Gastrointestinal: Reports: Abdominal Pain, Nausea, Vomiting


Genitourinary: Reports: No Symptoms


Musculoskeletal: Reports: Back Pain


Skin: Reports: No Symptoms


Psychiatric: Reports: No Symptoms


Neurological: Reports: No Symptoms


Hematologic/Lymphatic: Reports: No Symptoms


Immunologic: Reports: No Symptoms





Exam





- Exam


Exam: See Below





- Vital Signs


Vital Signs: 


 Last Vital Signs











Temp  36.5 C   04/12/19 04:00


 


Pulse  58 L  04/12/19 04:00


 


Resp  14   04/12/19 04:00


 


BP  115/74   04/12/19 04:00


 


Pulse Ox  95   04/12/19 04:00











Weight: 87.203 kg





- Exam


Quality Assessment: No: Supplemental Oxygen


General: Alert, Oriented, Cooperative


HEENT: Conjunctiva Clear, EACs Clear, EOMI, Hearing Intact, Mucosa Moist & [place]

, PERRLA


Neck: Supple, Trachea Midline, 2


Lungs: Clear to Auscultation, Normal Respiratory Effort


Cardiovascular: Regular Rate, Regular Rhythm


GI/Abdominal Exam: Normal Bowel Sounds, Soft, No Organomegaly, No Distention, 

Tender (Suprapubic area)


 (Male) Exam: Deferred


Rectal (Males) Exam: Deferred


Back Exam: Normal Inspection, Full Range of Motion, NT


Extremities: Normal Inspection, Normal Range of Motion, No Pedal Edema


Skin: Warm, Dry, Intact


Neurological: Cranial Nerves Intact, Normal Gait


Neuro Extensive - Mental Status: Alert, Oriented x3


Psychiatric: Alert, Normal Affect, Normal Mood





- Patient Data


Lab Results Last 24 hrs: 


 Laboratory Results - last 24 hr











  04/11/19 04/11/19 04/11/19 Range/Units





  22:23 23:22 23:22 


 


WBC   12.94 H   (4.0-11.0)  K/uL


 


RBC   4.96   (4.50-5.90)  M/uL


 


Hgb   15.3   (13.0-17.0)  g/dL


 


Hct   43.6   (38.0-50.0)  %


 


MCV   87.9   (80.0-98.0)  fL


 


MCH   30.8   (27.0-32.0)  pg


 


MCHC   35.1   (31.0-37.0)  g/dL


 


RDW Std Deviation   41.3   (28.0-62.0)  fl


 


RDW Coeff of Jadiel   13   (11.0-15.0)  %


 


Plt Count   274   (150-400)  K/uL


 


MPV   10.50   (7.40-12.00)  fL


 


Neut % (Auto)   80.3 H   (48.0-80.0)  %


 


Lymph % (Auto)   7.7 L   (16.0-40.0)  %


 


Mono % (Auto)   11.7   (0.0-15.0)  %


 


Eos % (Auto)   0.1   (0.0-7.0)  %


 


Baso % (Auto)   0.2   (0.0-1.5)  %


 


Neut # (Auto)   10.4 H   (1.4-5.7)  K/uL


 


Lymph # (Auto)   1.0   (0.6-2.4)  K/uL


 


Mono # (Auto)   1.5 H   (0.0-0.8)  K/uL


 


Eos # (Auto)   0.0   (0.0-0.7)  K/uL


 


Baso # (Auto)   0.0   (0.0-0.1)  K/uL


 


Nucleated RBC %   0.0   /100WBC


 


Nucleated RBCs #   0   K/uL


 


Sodium    144  (136-148)  mmol/L


 


Potassium    3.7  (3.5-5.1)  mmol/L


 


Chloride    106  ()  mmol/L


 


Carbon Dioxide    24.7  (21.0-32.0)  mmol/L


 


BUN    14  (7.0-18.0)  mg/dL


 


Creatinine    2.4 H  (0.8-1.3)  mg/dL


 


Est Cr Clr Drug Dosing    43.14  mL/min


 


Estimated GFR (MDRD)    30.0  ml/min


 


Glucose    124 H  ()  mg/dL


 


Calcium    8.8  (8.5-10.1)  mg/dL


 


Total Bilirubin    0.5  (0.2-1.0)  mg/dL


 


AST    22  (15-37)  IU/L


 


ALT    27  (14-63)  IU/L


 


Alkaline Phosphatase    53  ()  U/L


 


Total Protein    7.3  (6.4-8.2)  g/dL


 


Albumin    3.4  (3.4-5.0)  g/dL


 


Globulin    3.9  (2.6-4.0)  g/dL


 


Albumin/Globulin Ratio    0.9  (0.9-1.6)  


 


Amylase    30  ()  U/L


 


Lipase    95  ()  U/L


 


Urine Color  YELLOW    


 


Urine Appearance  CLEAR    


 


Urine pH  6.0    (5.0-8.0)  


 


Ur Specific Gravity  1.010    (1.001-1.035)  


 


Urine Protein  100 H    (NEGATIVE)  mg/dL


 


Urine Glucose (UA)  NEGATIVE    (NEGATIVE)  mg/dL


 


Urine Ketones  NEGATIVE    (NEGATIVE)  mg/dL


 


Urine Occult Blood  SMALL H    (NEGATIVE)  


 


Urine Nitrite  NEGATIVE    (NEGATIVE)  


 


Urine Bilirubin  NEGATIVE    (NEGATIVE)  


 


Urine Urobilinogen  0.2    (<2.0)  EU/dL


 


Ur Leukocyte Esterase  NEGATIVE    (NEGATIVE)  


 


Urine RBC  0-2    (0-2/HPF)  


 


Urine WBC  0-3    (0-5/HPF)  


 


Ur Epithelial Cells  RARE    (NONE-FEW)  


 


Urine Bacteria  FEW    (NEGATIVE)  


 


Urine Mucus  LIGHT    (NONE-MOD)  














  04/12/19 04/12/19 Range/Units





  05:20 05:20 


 


WBC  9.64   (4.0-11.0)  K/uL


 


RBC  4.52   (4.50-5.90)  M/uL


 


Hgb  13.6   (13.0-17.0)  g/dL


 


Hct  40.3   (38.0-50.0)  %


 


MCV  89.2   (80.0-98.0)  fL


 


MCH  30.1   (27.0-32.0)  pg


 


MCHC  33.7   (31.0-37.0)  g/dL


 


RDW Std Deviation  42.4   (28.0-62.0)  fl


 


RDW Coeff of Jadiel  13   (11.0-15.0)  %


 


Plt Count  238   (150-400)  K/uL


 


MPV  10.40   (7.40-12.00)  fL


 


Neut % (Auto)  64.7   (48.0-80.0)  %


 


Lymph % (Auto)  21.4   (16.0-40.0)  %


 


Mono % (Auto)  13.4   (0.0-15.0)  %


 


Eos % (Auto)  0.4   (0.0-7.0)  %


 


Baso % (Auto)  0.1   (0.0-1.5)  %


 


Neut # (Auto)  6.2 H   (1.4-5.7)  K/uL


 


Lymph # (Auto)  2.1   (0.6-2.4)  K/uL


 


Mono # (Auto)  1.3 H   (0.0-0.8)  K/uL


 


Eos # (Auto)  0.0   (0.0-0.7)  K/uL


 


Baso # (Auto)  0.0   (0.0-0.1)  K/uL


 


Nucleated RBC %  0.0   /100WBC


 


Nucleated RBCs #  0   K/uL


 


Sodium   144  (136-148)  mmol/L


 


Potassium   3.4 L  (3.5-5.1)  mmol/L


 


Chloride   106  ()  mmol/L


 


Carbon Dioxide   26.5  (21.0-32.0)  mmol/L


 


BUN   14  (7.0-18.0)  mg/dL


 


Creatinine   2.2 H  (0.8-1.3)  mg/dL


 


Est Cr Clr Drug Dosing   47.06  mL/min


 


Estimated GFR (MDRD)   33.2  ml/min


 


Glucose   101  ()  mg/dL


 


Calcium   8.0 L  (8.5-10.1)  mg/dL


 


Total Bilirubin    (0.2-1.0)  mg/dL


 


AST    (15-37)  IU/L


 


ALT    (14-63)  IU/L


 


Alkaline Phosphatase    ()  U/L


 


Total Protein    (6.4-8.2)  g/dL


 


Albumin    (3.4-5.0)  g/dL


 


Globulin    (2.6-4.0)  g/dL


 


Albumin/Globulin Ratio    (0.9-1.6)  


 


Amylase    ()  U/L


 


Lipase    ()  U/L


 


Urine Color    


 


Urine Appearance    


 


Urine pH    (5.0-8.0)  


 


Ur Specific Gravity    (1.001-1.035)  


 


Urine Protein    (NEGATIVE)  mg/dL


 


Urine Glucose (UA)    (NEGATIVE)  mg/dL


 


Urine Ketones    (NEGATIVE)  mg/dL


 


Urine Occult Blood    (NEGATIVE)  


 


Urine Nitrite    (NEGATIVE)  


 


Urine Bilirubin    (NEGATIVE)  


 


Urine Urobilinogen    (<2.0)  EU/dL


 


Ur Leukocyte Esterase    (NEGATIVE)  


 


Urine RBC    (0-2/HPF)  


 


Urine WBC    (0-5/HPF)  


 


Ur Epithelial Cells    (NONE-FEW)  


 


Urine Bacteria    (NEGATIVE)  


 


Urine Mucus    (NONE-MOD)  











Result Diagrams: 


 04/12/19 05:20





 04/12/19 05:20





- Problem List


(1) HTN (hypertension)


SNOMED Code(s): 48930384


   ICD Code: I10 - ESSENTIAL (PRIMARY) HYPERTENSION   Status: Chronic   Priority

: High   Current Visit: Yes   


Qualifiers: 


   Hypertension type: essential hypertension   Qualified Code(s): I10 - 

Essential (primary) hypertension   





(2) Abdominal pain


SNOMED Code(s): 23324521


   ICD Code: R10.9 - UNSPECIFIED ABDOMINAL PAIN   Status: Acute   Priority: 

High   Current Visit: Yes   


Qualifiers: 


   Abdominal location: lower abdomen, unspecified   Qualified Code(s): R10.30 - 

Lower abdominal pain, unspecified   





(3) Decreased renal function


SNOMED Code(s): 72708604


   ICD Code: N28.9 - DISORDER OF KIDNEY AND URETER, UNSPECIFIED   Status: Acute

   Priority: High   Current Visit: Yes   





(4) Renal insufficiency, mild


SNOMED Code(s): 537029407


   ICD Code: N28.9 - DISORDER OF KIDNEY AND URETER, UNSPECIFIED   Status: Acute

   Priority: High   Current Visit: Yes   


Problem List Initiated/Reviewed/Updated: Yes


Orders Last 24hrs: 


 Active Orders 24 hr











 Category Date Time Status


 


 Admission Status [Patient Status] [ADT] Routine ADT  04/12/19 02:01 Active


 


 Patient Status [ADT] Stat ADT  04/12/19 01:15 Active


 


 Notify Provider Consults [RC] ASDIRECTED Care  04/12/19 01:15 Active


 


 Consult to Physician [CONS] Stat Cons  04/12/19 01:15 Active


 


 Regular Diet [DIET] Diet  04/12/19 Breakfast Active


 


 Acetaminophen [Tylenol] Med  04/12/19 02:03 Active





 650 mg PO Q6H PRN   


 


 Morphine Med  04/12/19 02:03 Active





 2 mg IVPUSH Q2H PRN   


 


 Ondansetron [Zofran] Med  04/12/19 02:04 Active





 4 mg IVPUSH Q6H PRN   


 


 Sodium Chloride 0.9% [Saline Flush] Med  04/11/19 23:15 Active





 10 ml FLUSH ASDIRECTED PRN   


 


 Sodium Chloride 0.9% [Saline Flush] Med  04/12/19 02:02 Active





 10 ml FLUSH ASDIRECTED PRN   


 


 Sodium Chloride 0.9% [Saline Flush] Med  04/11/19 23:15 Active





 2.5 ml FLUSH ASDIRECTED PRN   


 


 Sodium Chloride 0.9% [Saline Flush] Med  04/12/19 02:02 Active





 2.5 ml FLUSH ASDIRECTED PRN   


 


 oxyCODONE Med  04/12/19 02:03 Active





 5 mg PO Q4H PRN   


 


 Convert IV to Saline Lock [OM.PC] Routine Ot  04/12/19 02:02 Ordered


 


 Saline Lock Insert [OM.PC] Stat Ot  04/11/19 23:14 Ordered








 Medication Orders





Acetaminophen (Tylenol)  650 mg PO Q6H PRN


   PRN Reason: Pain (mild 1-3)


Morphine Sulfate (Morphine)  2 mg IVPUSH Q2H PRN


   PRN Reason: Pain (severe 7-10)


Ondansetron HCl (Zofran)  4 mg IVPUSH Q6H PRN


   PRN Reason: Nausea/Vomiting


Oxycodone HCl (Oxycodone)  5 mg PO Q4H PRN


   PRN Reason: Pain (moderate 4-6)


Sodium Chloride (Saline Flush)  10 ml FLUSH ASDIRECTED PRN


   PRN Reason: Keep Vein Open


Sodium Chloride (Saline Flush)  2.5 ml FLUSH ASDIRECTED PRN


   PRN Reason: Keep Vein Open


Sodium Chloride (Saline Flush)  10 ml FLUSH ASDIRECTED PRN


   PRN Reason: Keep Vein Open


Sodium Chloride (Saline Flush)  2.5 ml FLUSH ASDIRECTED PRN


   PRN Reason: Keep Vein Open








Assessment/Plan Comment:: 





The patient is a 39-year-old gentleman who had been admitted secondary to lower 

abdominal and back pain. The patient did have a somewhat abnormal CT scan which 

did show minimal free pelvic fluid. This is nonspecific as the patient has 

improved somewhat. There was also noted that he had minor punctate 

calcifications in his prostate gland. The patient will be given a referral to 

urology for further outwork as an outpatient. There is no evidence of urinary 

tract infection. The patient says that his nausea and vomiting has improved 

significantly and he previously had been dehydrated and was noted that upon 

admission he had rise in his creatinine which indicates likely acute renal 

insufficiency. The patient will be monitored for his ability to tolerate his 

diet. If the patient has improved significantly he will be appropriate for 

discharge home later today.

## 2022-08-17 ENCOUNTER — HOSPITAL ENCOUNTER (EMERGENCY)
Dept: HOSPITAL 56 - MW.ED | Age: 42
Discharge: HOME | End: 2022-08-17
Payer: COMMERCIAL

## 2022-08-17 DIAGNOSIS — I10: ICD-10-CM

## 2022-08-17 DIAGNOSIS — H53.9: Primary | ICD-10-CM

## 2022-08-17 LAB
BUN SERPL-MCNC: 20 MG/DL (ref 7–18)
CHLORIDE SERPL-SCNC: 100 MMOL/L (ref 98–107)
CO2 SERPL-SCNC: 28.2 MMOL/L (ref 21–32)
EGFRCR SERPLBLD CKD-EPI 2021: 95 ML/MIN (ref 60–?)
GLUCOSE SERPL-MCNC: 106 MG/DL (ref 74–106)
POTASSIUM SERPL-SCNC: 5 MMOL/L (ref 3.5–5.1)
SODIUM SERPL-SCNC: 135 MMOL/L (ref 136–148)

## 2022-12-12 ENCOUNTER — HOSPITAL ENCOUNTER (EMERGENCY)
Dept: HOSPITAL 56 - MW.ED | Age: 42
Discharge: HOME | End: 2022-12-12
Payer: COMMERCIAL

## 2022-12-12 DIAGNOSIS — U07.1: Primary | ICD-10-CM

## 2022-12-12 DIAGNOSIS — I10: ICD-10-CM

## 2022-12-12 LAB
FLUAV RNA UPPER RESP QL NAA+PROBE: NEGATIVE
FLUBV RNA UPPER RESP QL NAA+PROBE: NEGATIVE
RSV RNA UPPER RESP QL NAA+PROBE: NEGATIVE
SARS-COV-2 RNA RESP QL NAA+PROBE: POSITIVE

## 2022-12-12 PROCEDURE — 0241U: CPT

## 2022-12-12 PROCEDURE — 99283 EMERGENCY DEPT VISIT LOW MDM: CPT
